# Patient Record
(demographics unavailable — no encounter records)

---

## 2025-02-11 NOTE — PLAN
[TextEntry] : I have discussed the lab results with the patient in detail and have encouraged continued adherence to her medical regimen. In addition, we discussed MOLS including diet and exercise. The patient should continue her current medical regimen as reviewed above and return to this office for further evaluation and treatment options in 6 months

## 2025-02-11 NOTE — RESULTS/DATA
[TextEntry] : BUN 20 SCr 0.62 CO2 33 K 4.9  TChol 143 LDL 64 Trig 101  UA 1.021 7.5 Protein(-) blood (- ) Pr/Cr 0.150

## 2025-02-11 NOTE — PHYSICAL EXAM
[General Appearance - Alert] : alert [General Appearance - In No Acute Distress] : in no acute distress [General Appearance - Well Nourished] : well nourished [General Appearance - Well Developed] : well developed [Extraocular Movements] : extraocular movements were intact [Hearing Threshold Finger Rub Not [place]] : hearing was normal [Jugular Venous Distention Increased] : there was no jugular-venous distention [] : no respiratory distress [Respiration, Rhythm And Depth] : normal respiratory rhythm and effort [Auscultation Breath Sounds / Voice Sounds] : lungs were clear to auscultation bilaterally [Heart Rate And Rhythm] : heart rate was normal and rhythm regular [Heart Sounds] : normal S1 and S2 [Heart Sounds Gallop] : no gallops [Murmurs] : no murmurs [Heart Sounds Pericardial Friction Rub] : no pericardial rub [Edema] : there was no peripheral edema [Abdomen Tenderness] : non-tender [Nail Clubbing] : no clubbing  or cyanosis of the fingernails [No Focal Deficits] : no focal deficits [Oriented To Time, Place, And Person] : oriented to person, place, and time [Affect] : the affect was normal

## 2025-02-11 NOTE — HISTORY OF PRESENT ILLNESS
[FreeTextEntry1] : The patient is a 63-year-old female with a history of DM II, hypertension, and proteinuria, last seen and evaluated in this office on July 26, 2024.  Since then, the patient has been in her usual state of health, not requiring hospitalization, nor undergoing any procedures.  The patient denies chest pain, Shortness of breath, nausea, vomiting, diarrhea and dysuria.  There have been no changes to her medical regimen, and her appetite remains good.

## 2025-02-11 NOTE — ASSESSMENT
[FreeTextEntry1] : Clinically stable with renal function at baseline and minimal proteinuria on  losartan BP is well controlled, and A1c acceptable as is her lipid profile